# Patient Record
(demographics unavailable — no encounter records)

---

## 2025-05-13 NOTE — REASON FOR VISIT
[Home] : at home, [unfilled] , at the time of the visit. [Medical Office: ([facility])___] : at the medical office located in  [Telephone (audio)] : This telephonic visit was provided via audio only technology. [Verbal consent obtained from patient] : the patient, [unfilled] [Follow-Up] : a follow-up visit [Pulmonary Embolism] : pulmonary embolism

## 2025-05-13 NOTE — ASSESSMENT
[FreeTextEntry1] : The patient is clinically stable.  The patient is scheduled for surgery on the renal stone.  It for his knowledge to get a B approach from the back.  It is okay for the patient to stop the anticoagulation 3 days prior to surgery.  The patient will be hospitalized for either 1 day or 3 days depending on his imaging.  My concern that the patient has chronic thromboembolic disease with chronic disease and the lung with residual PE and venous Doppler.  The CT scan of the chest did not reveal any any evidence of underlying malignancy.  At this point I will discuss the case with the urologist regarding DVT prophylaxis postop as he has to be off full anticoagulation for at least 7 days as per urology.  I gave the patient my cell phone number for the urologist office to contact me to discuss have a direct discussion with them.  Patient will schedule an appointment when he comes back to [place] to repeat venous Doppler VQ scan and consideration for decreased the dose to prophylaxis.

## 2025-07-07 NOTE — END OF VISIT
[FreeTextEntry3] :  I personally obtained a history,performed a physical and outlined a plan of care 
Detail Level: Zone

## 2025-07-07 NOTE — DISCUSSION/SUMMARY
[FreeTextEntry1] : EKG:NSR he's due for labs with PCP- reviewed ccta with him- given calcium score would favor treatment for HLD- discussed diet

## 2025-07-07 NOTE — PHYSICAL EXAM
[Normal Conjunctiva] : normal conjunctiva [Normal Venous Pressure] : normal venous pressure [No Carotid Bruit] : no carotid bruit [Normal S1, S2] : normal S1, S2 [No Murmur] : no murmur [Normal] : clear lung fields, good air entry, no respiratory distress [Soft] : abdomen soft [Non Tender] : non-tender [Normal Gait] : normal gait [No Edema] : no edema [No Rash] : no rash [Moves all extremities] : moves all extremities [Alert and Oriented] : alert and oriented

## 2025-07-07 NOTE — HISTORY OF PRESENT ILLNESS
[FreeTextEntry1] :  55 year old M with PMHx of Chron's, pulmonary embolism, DVT, and HLD presents to reestablish cardiac care. Last seen 12/12/2023 for cardiac referral by Dr Andrea.     Pt reports kidney stone removal in May and subsequent cessation of exercise until resuming training with . Denies difficulty walking, chest pain, SOB, palpitations. Notes dizziness after prolonged sitting and occasional R leg swelling. No falls or fainting.  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